# Patient Record
Sex: MALE | Race: WHITE | URBAN - METROPOLITAN AREA
[De-identification: names, ages, dates, MRNs, and addresses within clinical notes are randomized per-mention and may not be internally consistent; named-entity substitution may affect disease eponyms.]

---

## 2019-07-08 ENCOUNTER — IMPORTED ENCOUNTER (OUTPATIENT)
Dept: URBAN - METROPOLITAN AREA CLINIC 50 | Facility: CLINIC | Age: 74
End: 2019-07-08

## 2019-07-18 ENCOUNTER — IMPORTED ENCOUNTER (OUTPATIENT)
Dept: URBAN - METROPOLITAN AREA CLINIC 50 | Facility: CLINIC | Age: 74
End: 2019-07-18

## 2019-08-02 ENCOUNTER — IMPORTED ENCOUNTER (OUTPATIENT)
Dept: URBAN - METROPOLITAN AREA CLINIC 50 | Facility: CLINIC | Age: 74
End: 2019-08-02

## 2019-08-07 ENCOUNTER — IMPORTED ENCOUNTER (OUTPATIENT)
Dept: URBAN - METROPOLITAN AREA CLINIC 50 | Facility: CLINIC | Age: 74
End: 2019-08-07

## 2019-08-07 PROBLEM — Z96.1: Noted: 2019-08-21

## 2019-08-07 PROBLEM — Z96.1: Noted: 2019-08-07

## 2019-08-07 NOTE — PATIENT DISCUSSION
"""S/P IOL OS: Tecnis ZCB00 25 (ORA) +ORA/Femto/Arcs +Omidria. Continue post operative instructions and drops per schedule.  """

## 2019-08-16 ENCOUNTER — IMPORTED ENCOUNTER (OUTPATIENT)
Dept: URBAN - METROPOLITAN AREA CLINIC 50 | Facility: CLINIC | Age: 74
End: 2019-08-16

## 2019-08-21 ENCOUNTER — IMPORTED ENCOUNTER (OUTPATIENT)
Dept: URBAN - METROPOLITAN AREA CLINIC 50 | Facility: CLINIC | Age: 74
End: 2019-08-21

## 2019-08-21 NOTE — PATIENT DISCUSSION
"""S/P IOL OD: Tecnis ZCB00 26.0 +Femto +Omidria. Continue post operative instructions and drops per schedule.  """

## 2019-08-26 ENCOUNTER — IMPORTED ENCOUNTER (OUTPATIENT)
Dept: URBAN - METROPOLITAN AREA CLINIC 50 | Facility: CLINIC | Age: 74
End: 2019-08-26

## 2019-09-09 ENCOUNTER — IMPORTED ENCOUNTER (OUTPATIENT)
Dept: URBAN - METROPOLITAN AREA CLINIC 50 | Facility: CLINIC | Age: 74
End: 2019-09-09

## 2019-09-20 ENCOUNTER — IMPORTED ENCOUNTER (OUTPATIENT)
Dept: URBAN - METROPOLITAN AREA CLINIC 50 | Facility: CLINIC | Age: 74
End: 2019-09-20

## 2019-12-20 ENCOUNTER — IMPORTED ENCOUNTER (OUTPATIENT)
Dept: URBAN - METROPOLITAN AREA CLINIC 50 | Facility: CLINIC | Age: 74
End: 2019-12-20

## 2019-12-20 NOTE — PATIENT DISCUSSION
"""Annual Type 2 Diabetic eye exam with dilation. Mild diabetic retinopathy found.  Macular edema ""

## 2020-04-10 ENCOUNTER — IMPORTED ENCOUNTER (OUTPATIENT)
Dept: URBAN - METROPOLITAN AREA CLINIC 50 | Facility: CLINIC | Age: 75
End: 2020-04-10

## 2020-04-17 ENCOUNTER — IMPORTED ENCOUNTER (OUTPATIENT)
Dept: URBAN - METROPOLITAN AREA CLINIC 50 | Facility: CLINIC | Age: 75
End: 2020-04-17

## 2020-04-17 NOTE — PATIENT DISCUSSION
"""Patient has an appt with Dr. Alvaro Hendricks 4/24. Annual Type 2 Diabetic eye exam with dilation.  Mild ""

## 2020-07-16 ENCOUNTER — IMPORTED ENCOUNTER (OUTPATIENT)
Dept: URBAN - METROPOLITAN AREA CLINIC 50 | Facility: CLINIC | Age: 75
End: 2020-07-16

## 2020-07-16 NOTE — PATIENT DISCUSSION
"""Type 2 diabetic eye exam with dilation. Mild diabetic retinopathy found. Macular edema is not present in the right eye. Patient instructed to call office immediately if sudden changes in vision occur. Emphasized importance of good blood glucose control. Return in 6 months for dilated fundus exam. Summary of care provided to the physician managing the ongoing diabetes care. """

## 2020-07-16 NOTE — PATIENT DISCUSSION
"""Follow with Dr Olya Nogueira. Type 2 diabetic eye exam with dilation.  Mild diabetic retinopathy found

## 2021-01-21 ENCOUNTER — IMPORTED ENCOUNTER (OUTPATIENT)
Dept: URBAN - METROPOLITAN AREA CLINIC 50 | Facility: CLINIC | Age: 76
End: 2021-01-21

## 2021-01-21 NOTE — PATIENT DISCUSSION
"""Sympomatic PVD right eye. Extended ophthalmoscopy performed today. No evidence of retinal tears seen on Goldmann 3-mirror exam. RD precautions discussed. Patient instructed to call if vision decreases or RD warning signs increase/worsen.  """

## 2021-01-28 ENCOUNTER — IMPORTED ENCOUNTER (OUTPATIENT)
Dept: URBAN - METROPOLITAN AREA CLINIC 50 | Facility: CLINIC | Age: 76
End: 2021-01-28

## 2021-01-28 NOTE — PATIENT DISCUSSION
"""Informed patient that their capsular opacification is visually significant and meets the minimum criteria for capsulotomy by YAG laser to increase their vision and decrease their glare symptoms. RBAs of procedure discussed.  "" ""Start Prednisolone Acetate right eye twice a day

## 2021-02-25 ENCOUNTER — PREPPED CHART (OUTPATIENT)
Dept: URBAN - METROPOLITAN AREA CLINIC 49 | Facility: CLINIC | Age: 76
End: 2021-02-25

## 2021-02-25 ENCOUNTER — IMPORTED ENCOUNTER (OUTPATIENT)
Dept: URBAN - METROPOLITAN AREA CLINIC 50 | Facility: CLINIC | Age: 76
End: 2021-02-25

## 2021-03-19 ENCOUNTER — IMPORTED ENCOUNTER (OUTPATIENT)
Dept: URBAN - METROPOLITAN AREA CLINIC 50 | Facility: CLINIC | Age: 76
End: 2021-03-19

## 2021-04-17 ASSESSMENT — VISUAL ACUITY
OS_CC: J1
OS_CC: J2
OS_OTHER: 20/70-. 20/200.
OD_CC: J1
OS_SC: 20/20 WITH EFFORT
OD_BAT: 20/20-1
OS_SC: 20/30-2
OS_SC: 20/20-2
OD_OTHER: 20/30-2. 20/70.
OS_PH: 20/40
OS_CC: J2
OD_OTHER: 20/50. 20/200.
OD_OTHER: 20/50. 20/200.
OD_CC: 20/25
OS_OTHER: 20/60. 20/100.
OD_BAT: 20/50
OD_CC: J2
OD_BAT: 20/50
OD_CC: J2@ 14 IN
OD_SC: 20/30
OD_SC: 20/20-1
OD_SC: 20/20
OS_CC: 20/60
OS_CC: 20/40+
OS_SC: 20/40
OS_SC: 20/70-
OS_BAT: 20/40
OD_CC: J2
OS_BAT: 20/50
OD_BAT: 20/30-2
OS_BAT: 20/60
OS_OTHER: 20/60. 20/60.
OS_CC: 20/50-
OD_SC: 20/20-
OD_CC: J1+@ 16 IN
OS_CC: J4
OD_CC: 20/25-
OD_CC: 20/30
OD_SC: 20/20
OS_BAT: 20/70-
OS_CC: 20/20-2
OD_SC: 20/20-1
OD_BAT: 20/60
OD_CC: J4
OD_OTHER: 20/60. 20/80.
OS_SC: 20/20
OS_BAT: 20/60
OD_OTHER: 20/20-1. 20/20-1.
OS_OTHER: 20/40. 20/60-1.
OS_CC: J2@ 14 IN
OD_SC: 20/25-1
OD_BAT: 20/50
OS_SC: 20/50
OD_SC: 20/25-1
OD_OTHER: 20/50. 20/200.
OS_SC: 20/20-
OS_CC: J1+@ 16 IN
OS_OTHER: 20/50. 20/70.

## 2021-04-17 ASSESSMENT — TONOMETRY
OD_IOP_MMHG: 14
OD_IOP_MMHG: 14
OD_IOP_MMHG: 12
OS_IOP_MMHG: 16
OD_IOP_MMHG: 12
OS_IOP_MMHG: 15
OD_IOP_MMHG: 15
OS_IOP_MMHG: 15
OS_IOP_MMHG: 16
OS_IOP_MMHG: 23
OS_IOP_MMHG: 10
OD_IOP_MMHG: 35
OS_IOP_MMHG: 14
OS_IOP_MMHG: 12
OS_IOP_MMHG: 15
OS_IOP_MMHG: 16
OD_IOP_MMHG: 15
OD_IOP_MMHG: 16
OD_IOP_MMHG: 16
OS_IOP_MMHG: 14
OS_IOP_MMHG: 15
OD_IOP_MMHG: 12
OD_IOP_MMHG: 16

## 2021-05-11 ENCOUNTER — PROBLEM (OUTPATIENT)
Dept: URBAN - METROPOLITAN AREA CLINIC 49 | Facility: CLINIC | Age: 76
End: 2021-05-11

## 2021-05-11 DIAGNOSIS — H02.051: ICD-10-CM

## 2021-05-11 DIAGNOSIS — H04.123: ICD-10-CM

## 2021-05-11 PROCEDURE — 67820 REVISE EYELASHES: CPT

## 2021-05-11 PROCEDURE — 92012 INTRM OPH EXAM EST PATIENT: CPT

## 2021-05-11 RX ORDER — MINERAL OIL, PETROLATUM 425; 573 MG/G; MG/G
1 OINTMENT OPHTHALMIC EVERY EVENING
Start: 2021-05-11

## 2021-05-11 ASSESSMENT — VISUAL ACUITY
OD_SC: 20/30-
OU_SC: J2
OS_SC: 20/50

## 2021-05-11 ASSESSMENT — TONOMETRY
OS_IOP_MMHG: 14
OD_IOP_MMHG: 15